# Patient Record
Sex: MALE | Race: WHITE | NOT HISPANIC OR LATINO
[De-identification: names, ages, dates, MRNs, and addresses within clinical notes are randomized per-mention and may not be internally consistent; named-entity substitution may affect disease eponyms.]

---

## 2024-11-20 ENCOUNTER — DASHBOARD ENCOUNTERS (OUTPATIENT)
Age: 54
End: 2024-11-20

## 2024-11-20 ENCOUNTER — LAB OUTSIDE AN ENCOUNTER (OUTPATIENT)
Dept: URBAN - METROPOLITAN AREA CLINIC 78 | Facility: CLINIC | Age: 54
End: 2024-11-20

## 2024-11-20 ENCOUNTER — OFFICE VISIT (OUTPATIENT)
Dept: URBAN - METROPOLITAN AREA CLINIC 78 | Facility: CLINIC | Age: 54
End: 2024-11-20
Payer: COMMERCIAL

## 2024-11-20 VITALS
SYSTOLIC BLOOD PRESSURE: 105 MMHG | TEMPERATURE: 98.1 F | HEIGHT: 72 IN | DIASTOLIC BLOOD PRESSURE: 72 MMHG | WEIGHT: 230.2 LBS | HEART RATE: 64 BPM | BODY MASS INDEX: 31.18 KG/M2

## 2024-11-20 DIAGNOSIS — Z12.11 COLON CANCER SCREENING: ICD-10-CM

## 2024-11-20 PROCEDURE — 99203 OFFICE O/P NEW LOW 30 MIN: CPT | Performed by: INTERNAL MEDICINE

## 2024-11-20 RX ORDER — LEVOTHYROXINE SODIUM 125 UG/1
TABLET ORAL
Qty: 90 TABLET | Status: ACTIVE | COMMUNITY

## 2024-11-20 NOTE — HPI-TODAY'S VISIT:
Patient has been referred by MD Ferdinand  - - - - - - - - - - - - - - - - - - - - - - - - - - - - - - - - - - --  The patient presents for a colon cancer screening.  Patient has NEVER had a colonoscopy.  There is no family history of colon polyps or cancer.   Patient denies change in bowel habits, appetite and weight.  Patient denies bleeding per rectum.

## 2025-01-07 ENCOUNTER — CLAIMS CREATED FROM THE CLAIM WINDOW (OUTPATIENT)
Dept: URBAN - METROPOLITAN AREA SURGERY CENTER 15 | Facility: SURGERY CENTER | Age: 55
End: 2025-01-07
Payer: COMMERCIAL

## 2025-01-07 DIAGNOSIS — D12.2 BENIGN NEOPLASM OF ASCENDING COLON: ICD-10-CM

## 2025-01-07 DIAGNOSIS — Z12.11 COLON CANCER SCREENING: ICD-10-CM

## 2025-01-07 DIAGNOSIS — Z12.11 ENCOUNTER FOR SCREENING FOR MALIGNANT NEOPLASM OF COLON: ICD-10-CM

## 2025-01-07 DIAGNOSIS — D12.2 ADENOMA OF ASCENDING COLON: ICD-10-CM

## 2025-01-07 DIAGNOSIS — D12.5 BENIGN NEOPLASM OF SIGMOID COLON: ICD-10-CM

## 2025-01-07 DIAGNOSIS — D12.5 ADENOMA OF SIGMOID COLON: ICD-10-CM

## 2025-01-07 DIAGNOSIS — D12.3 BENIGN NEOPLASM OF TRANSVERSE COLON: ICD-10-CM

## 2025-01-07 DIAGNOSIS — D12.3 ADENOMA OF TRANSVERSE COLON: ICD-10-CM

## 2025-01-07 PROCEDURE — 0528F RCMND FLW-UP 10 YRS DOCD: CPT | Performed by: STUDENT IN AN ORGANIZED HEALTH CARE EDUCATION/TRAINING PROGRAM

## 2025-01-07 PROCEDURE — 45385 COLONOSCOPY W/LESION REMOVAL: CPT | Performed by: STUDENT IN AN ORGANIZED HEALTH CARE EDUCATION/TRAINING PROGRAM

## 2025-01-07 PROCEDURE — 00812 ANES LWR INTST SCR COLSC: CPT | Performed by: CASE MANAGER/CARE COORDINATOR

## 2025-01-07 RX ORDER — LEVOTHYROXINE SODIUM 125 UG/1
TABLET ORAL
Qty: 90 TABLET | Status: ACTIVE | COMMUNITY